# Patient Record
Sex: FEMALE | Race: WHITE | NOT HISPANIC OR LATINO | Employment: UNEMPLOYED | URBAN - METROPOLITAN AREA
[De-identification: names, ages, dates, MRNs, and addresses within clinical notes are randomized per-mention and may not be internally consistent; named-entity substitution may affect disease eponyms.]

---

## 2023-08-10 ENCOUNTER — OFFICE VISIT (OUTPATIENT)
Dept: OBSTETRICS AND GYNECOLOGY | Facility: CLINIC | Age: 28
End: 2023-08-10
Payer: COMMERCIAL

## 2023-08-10 VITALS — WEIGHT: 128.4 LBS | SYSTOLIC BLOOD PRESSURE: 110 MMHG | DIASTOLIC BLOOD PRESSURE: 80 MMHG

## 2023-08-10 DIAGNOSIS — Z01.419 WELL WOMAN EXAM WITH ROUTINE GYNECOLOGICAL EXAM: Primary | ICD-10-CM

## 2023-08-10 DIAGNOSIS — N89.8 VAGINAL IRRITATION: ICD-10-CM

## 2023-08-10 DIAGNOSIS — N83.209 CYST OF OVARY, UNSPECIFIED LATERALITY: ICD-10-CM

## 2023-08-10 RX ORDER — FEXOFENADINE HCL 60 MG/1
60 TABLET, FILM COATED ORAL DAILY
COMMUNITY

## 2023-08-10 RX ORDER — CLINDAMYCIN HYDROCHLORIDE 300 MG/1
CAPSULE ORAL
COMMUNITY
Start: 2023-07-19 | End: 2023-08-10

## 2023-08-10 RX ORDER — AMOXICILLIN AND CLAVULANATE POTASSIUM 500; 125 MG/1; MG/1
1 TABLET, FILM COATED ORAL EVERY 12 HOURS SCHEDULED
COMMUNITY
Start: 2023-06-07 | End: 2023-08-10

## 2023-08-10 NOTE — PROGRESS NOTES
Subjective   Chief Complaint   Patient presents with    Gynecologic Exam     Pt states that she had itching last month and want to be examine  and pt would like US today to check on her cyst removal     Bhutanese  used for duration of visit  Karuna Galeas is a 27 y.o. year old  presenting to be seen for her annual exam. She states she had some vaginal itching after recent antibiotic use which is currently resolved. She states she has history of ovarian cysts and has had left ovarian cyst removed in the past. She was told by her previous provider in [place] to have ultrasound follow up in the future. She and her  have been in [place] since Dec 2022. She likes the area.   She does not think she has had a pap in the past.     SEXUAL Hx:  She is currently sexually active.  In the past year there there has been NO new sexual partners.    Condoms are never used.  She would not like to be screened for STD's at today's exam.   Current birth control method:  none, she is not currently not trying to conceive, she is not having intercourse at this time .  She is happy with her current method of contraception and does not want to discuss alternative methods of contraception.  MENSTRUAL Hx:  Patient's last menstrual period was 2023 (approximate).  In the past 6 months her cycles have been regular, predictable and occur monthly.  Her menstrual flow is typically normal.   Each month on average there are roughly 0 day(s) of very heavy flow.    Intermenstrual bleeding is absent.    Post-coital bleeding is absent.  Dysmenorrhea: is not affecting her activities of daily living  PMS: is not affecting her activities of daily living  Her cycles ARE a source of concern for her that she wishes to discuss today. She states some months she will have one day of bleeding at time other than her regular cycle. This tends to occur around the middle     OTHER THINGS SHE WANTS TO DISCUSS TODAY:  Nothing else    The  following portions of the patient's history were reviewed and updated as appropriate:problem list, current medications, allergies, past family history, past medical history, past social history, and past surgical history.    Social History    Tobacco Use      Smoking status: Never      Smokeless tobacco: Never    Review of Systems  Constitutional POS: nothing reported    NEG: anorexia or night sweats   Genitourinary POS: nothing reported    NEG: dysuria or hematuria      Gastointestinal POS: nothing reported    NEG: bloating, change in bowel habits, melena, or reflux symptoms   Integument POS: nothing reported    NEG: moles that are changing in size, shape, color or rashes   Breast POS: nothing reported    NEG: persistent breast lump, skin dimpling, or nipple discharge        Objective   /80 (BP Location: Right arm, Patient Position: Sitting, Cuff Size: Adult)   Wt 58.2 kg (128 lb 6.4 oz)   LMP 07/22/2023 (Approximate)     General:  well developed; well nourished  no acute distress   Skin:  No suspicious lesions seen   Thyroid: normal to inspection and palpation   Breasts:  Examined in supine position  Symmetric without masses or skin dimpling  Nipples normal without inversion, lesions or discharge  There are no palpable axillary nodes  Fibrocystic changes are present both breasts without a discrete mass   Abdomen: soft, non-tender; no masses  no umbilical or inguinal hernias are present  no hepato-splenomegaly   Pelvis: Clinical staff was present for exam  :  urethral meatus normal;  Vaginal:  normal pink mucosa without prolapse or lesions.  Cervix:  normal appearance.  Uterus:  normal size, shape and consistency.  Adnexa:  normal bimanual exam of the adnexa.  Rectal:  digital rectal exam not performed; anus visually normal appearing.        Assessment   Well woman with routine gynecological exam  Vaginal irritation- currently mostly resolved   History of left ovarian cyst      Plan     Pap was not done  today.  I explained to Karuna that the recommendations for Pap smear interval in a low risk patient has lengthened to 3 years time.  I told Karuna she still needs to be seen in our office yearly for a full physical including breast and pelvic exam.  One swab collected for yeast panel, will await results prior to treating for possible yeast as her symptoms are improving currently   Will have patient rtc for tvus for follow up on history of ovarian cysts and for midcycle bleeding.   The importance of keeping all planned follow-up and taking all medications as prescribed was emphasized.  Today I discussed with Karuna the total recommended calcium intake for a premenopausal female is 1000 mg.  Ideally this should be from dietary sources.  I reviewed calcium content in various foods including milk, fortified orange juice and yogurt.  If she cannot get sufficient calcium through dietary means, it is recommended to supplement with either a multivitamin or calcium to reach her daily goal.  I also reviewed the difference in the bioavailability of calcium carbonate and calcium citrate containing supplements and the importance of taking calcium carbonate containing products with food.  Finally, vitamin D's role in calcium absorption was reviewed and a total daily vitamin D intake of 800 units was recommended.  Follow up for annual exam 1 year and at her convenience for tvus for follow up on above     No orders of the defined types were placed in this encounter.         This note was electronically signed.    Kanchan Henley, LEXX  August 10, 2023

## 2023-08-14 LAB — REF LAB TEST METHOD: NORMAL

## 2023-08-15 PROBLEM — Z01.419 NORMAL GYNECOLOGIC EXAMINATION: Status: ACTIVE | Noted: 2023-08-15

## 2023-08-23 ENCOUNTER — TELEPHONE (OUTPATIENT)
Dept: OBSTETRICS AND GYNECOLOGY | Facility: CLINIC | Age: 28
End: 2023-08-23
Payer: COMMERCIAL

## 2023-08-23 RX ORDER — BORIC ACID
600 POWDER (GRAM) MISCELLANEOUS NIGHTLY
Qty: 21 SUPPOSITORY | Refills: 0 | Status: SHIPPED | OUTPATIENT
Start: 2023-08-23

## 2023-08-23 RX ORDER — FLUCONAZOLE 150 MG/1
150 TABLET ORAL DAILY
Qty: 1 TABLET | Refills: 0 | Status: SHIPPED | OUTPATIENT
Start: 2023-08-23

## 2023-08-24 ENCOUNTER — TELEPHONE (OUTPATIENT)
Dept: OBSTETRICS AND GYNECOLOGY | Facility: CLINIC | Age: 28
End: 2023-08-24
Payer: COMMERCIAL

## 2023-08-24 NOTE — TELEPHONE ENCOUNTER
Per Kanchan I called pt with the use of a [place]  and patient stated that she did not need . I let her know about her swab being positive for a strain of yeast. Diflucan sent for one-time dose because it was an atypical strain of yeast boric acid suppositories were also sent and that Kanchan sent both of her prescriptions to [facility] on [facility] as she will need this for the compounding.  Per Kanchan, Karuna  results are not in the chart so I could not link this to her 1 swab .Patient verbal understood and was given address and phone number.

## 2023-10-30 NOTE — PROGRESS NOTES
"Subjective   Chief Complaint   Patient presents with    Menstrual Problem     Mid cycle bleeding with HX of ovarian cyst follow up after US     Karuna Galeas is a 28 y.o. year old No obstetric history on file..  Patient's last menstrual period was 10/14/2023 (exact date).  She presents to be seen for tvus for further evaluation of mid cycle bleeding and history of ovarian cysts. She states her mid cycle bleeding has improved since her annual in August.  She has questions today about trying to conceive and cycle tracking. She is concerned about decreased libido and if this could be due to a hormone imbalance which could affect fertility.   She reports regular, non bothersome periods.  She reports at times she will have pain on her left side at the sight of her surgery. She denies severe pain and pain will resolve quickly without treatment. She states because of her history this pain makes her concerned about possible reoccurrence of cyst.     The following portions of the patient's history were reviewed and updated as appropriate:current medications and allergies    Social History    Tobacco Use      Smoking status: Never      Smokeless tobacco: Never         Objective   /70 (BP Location: Left arm, Patient Position: Sitting, Cuff Size: Adult)   Ht 165.1 cm (65\")   Wt 57.6 kg (127 lb)   LMP 10/14/2023 (Exact Date)   BMI 21.13 kg/m²     Lab Review   No data reviewed    Imaging   Pelvic ultrasound report   Tvus in office today with no ovarian cysts noted     Assessment   History of ovarian cysts  Llq pelvic pain- none today  Preconception concerns      Plan   Reviewed ultrasound findings with patient.  Discussed pre conception concerns with patient. As she is having regular monthly periods no further testing need at this time. When she is ready to try to conceive discussed starting pnv 4 weeks prior to starting to try to conceive.Discuss period and ovulation tracking. Discussed with regular periods would not " suspect hormone imbalance.   Discussed llq pain could be related to scar tissue or adhesions. As this pain is mild and non bothersome no further evaluation needed at this time  The importance of keeping all planned follow-up and taking all medications as prescribed was emphasized.  Rtc for annual or prn        No orders of the defined types were placed in this encounter.         This note was electronically signed.    Kanchan Henley, LEXX  October 31, 2023

## 2023-10-31 ENCOUNTER — OFFICE VISIT (OUTPATIENT)
Dept: OBSTETRICS AND GYNECOLOGY | Facility: CLINIC | Age: 28
End: 2023-10-31
Payer: COMMERCIAL

## 2023-10-31 VITALS
HEIGHT: 65 IN | WEIGHT: 127 LBS | BODY MASS INDEX: 21.16 KG/M2 | DIASTOLIC BLOOD PRESSURE: 70 MMHG | SYSTOLIC BLOOD PRESSURE: 110 MMHG

## 2023-10-31 DIAGNOSIS — R10.32 LLQ ABDOMINAL PAIN: ICD-10-CM

## 2023-10-31 DIAGNOSIS — Z87.42 HISTORY OF OVARIAN CYST: Primary | ICD-10-CM

## 2025-03-03 ENCOUNTER — TELEPHONE (OUTPATIENT)
Dept: OBSTETRICS AND GYNECOLOGY | Facility: CLINIC | Age: 30
End: 2025-03-03

## 2025-03-03 NOTE — TELEPHONE ENCOUNTER
Caller: KENNEDY HANNON    Best call back number: [phone]    PATIENT CALLED REQUESTING TO CANCEL SAME DAY APPT.    Did the patient call AFTER the start time of their scheduled appointment?  []YES  [x]NO    Was the patient's appointment rescheduled? []YES  [x]NO

## 2025-04-17 ENCOUNTER — TELEPHONE (OUTPATIENT)
Dept: OBSTETRICS AND GYNECOLOGY | Facility: CLINIC | Age: 30
End: 2025-04-17

## 2025-04-17 DIAGNOSIS — N91.2 AMENORRHEA: Primary | ICD-10-CM

## 2025-04-17 NOTE — TELEPHONE ENCOUNTER
Caller: Karuna Galeas    Relationship to patient: Self    Best call back number: [phone]      Chief complaint: MISSED PERIOD     Type of visit: GYN FOLLOW UP     Requested date: ASAP     Additional notes:PATIENT MISSED PERIOD THIS MONTH, NEGATIVE HOME PREGNANCY TEST, BUT HAVING PREGNANCY SYMPTOMS.  WOULD LIKE TO COME IN FOR LABS TO CONFIRM.    HUB WAS UNABLE TO WARM TRANSFER

## 2025-06-20 ENCOUNTER — OFFICE VISIT (OUTPATIENT)
Dept: OBSTETRICS AND GYNECOLOGY | Facility: CLINIC | Age: 30
End: 2025-06-20

## 2025-06-20 VITALS
HEIGHT: 65 IN | DIASTOLIC BLOOD PRESSURE: 70 MMHG | SYSTOLIC BLOOD PRESSURE: 106 MMHG | WEIGHT: 124.6 LBS | BODY MASS INDEX: 20.76 KG/M2

## 2025-06-20 DIAGNOSIS — N89.8 VAGINAL ITCHING: Primary | ICD-10-CM

## 2025-06-20 NOTE — PROGRESS NOTES
"Subjective   Chief Complaint   Patient presents with    Follow-up     Due to language barrier, an  ([id number])  was present during the history-taking and subsequent discussion (and for part of the physical exam) with this patient. / vaginal itching and burning (took antibiotics and burning started, itching started right after intercourse) does not burn when she pees, only burns during/after intercourse/ periods are irregular, no pregnancy symptoms, declines doing a UPT     Karuna Galeas is a 29 y.o. year old No obstetric history on file. presenting to be seen for vaginal itching.     History of Present Illness  The patient presents for evaluation of vaginal itching and burning. She has an online [place] .    She reports experiencing itching and burning sensations in the vaginal area, predominantly post-coital. These symptoms have persisted for approximately 3 months. She has been on a course of antibiotics previously, which she thinks started her symptoms. She expresses reluctance towards undergoing STD testing, citing monogamy in her relationship with her .No burning with urination.          Objective   /70   Ht 165.1 cm (65\")   Wt 56.5 kg (124 lb 9.6 oz)   LMP 05/22/2025 (Exact Date)   BMI 20.73 kg/m²     General:  well developed; well nourished  no acute distress  mentation appropriate   Skin:  No suspicious lesions seen   Thyroid: not examined   Breasts:  Not performed.   Abdomen: soft, non-tender; no masses   Pelvis: Clinical staff was present for exam  External genitalia:  normal appearance of the external genitalia including Bartholin's and Goldston's glands.  :  urethral meatus normal; urethra normal:  Vaginal:  normal pink mucosa without prolapse or lesions.  Cervix:  normal appearance. Normal appearing discharge  Uterus:  normal size, shape and consistency.  Adnexa:  normal bimanual exam of the adnexa.        Assessment & Plan  1. Vaginal itching and " burning  Testing for yeast infection and bacterial vaginosis will be conducted. STD testing is also recommended due to the duration and nature of symptoms, but patient declines.  - Overall normal appearing discharge -- will base treatment on results of Oneswab         No orders of the defined types were placed in this encounter.     Follow up as needed    This note was electronically signed.          Patient or patient representative verbalized consent for the use of Ambient Listening during the visit with  Brandi Holland MD for chart documentation. 6/20/2025  09:44 EDT    Brandi Holland MD  Obstetrics and Gynecology  [facility]